# Patient Record
Sex: MALE | Race: WHITE | NOT HISPANIC OR LATINO | URBAN - NONMETROPOLITAN AREA
[De-identification: names, ages, dates, MRNs, and addresses within clinical notes are randomized per-mention and may not be internally consistent; named-entity substitution may affect disease eponyms.]

---

## 2017-03-02 RX ORDER — NEBIVOLOL HYDROCHLORIDE 10 MG/1
10 TABLET ORAL DAILY
Qty: 30 TABLET | Refills: 0 | Status: CANCELLED | OUTPATIENT
Start: 2017-03-02

## 2017-03-02 RX ORDER — NEBIVOLOL HYDROCHLORIDE 10 MG/1
10 TABLET ORAL DAILY
COMMUNITY
Start: 2016-12-28 | End: 2017-03-03 | Stop reason: SDUPTHER

## 2017-03-02 NOTE — TELEPHONE ENCOUNTER
----- Message from Elina Palmer sent at 3/2/2017  1:26 PM CST -----  Regarding: Refill Request  Received call from Sanjeev, patient is needing Bistolic 10mg refilled. Explained to pharmacy that he has not been seen in over a year and no showed to his last appointment. Patient called office and resched appt for 3/22 with Dr Servin. Patient is requesting 30 day supply if possible and would like call back to know for sure that a 30 day supply was sent in. He is going to [place] this week and will run out before his 3/22 appt.    Thanks        SENT RX TO DR SERVIN TO REVIEW

## 2017-03-03 ENCOUNTER — TELEPHONE (OUTPATIENT)
Dept: CARDIOLOGY | Facility: CLINIC | Age: 37
End: 2017-03-03

## 2017-03-03 RX ORDER — NEBIVOLOL HYDROCHLORIDE 10 MG/1
10 TABLET ORAL DAILY
Qty: 30 TABLET | Refills: 11 | Status: SHIPPED | OUTPATIENT
Start: 2017-03-03 | End: 2017-03-22 | Stop reason: SDUPTHER

## 2017-03-03 NOTE — TELEPHONE ENCOUNTER
----- Message from Olimpia Haji sent at 3/3/2017  8:49 AM CST -----  PATIENT'S WIFE STATED THE RX STILL ISN'T AT THE PHARMACY. THEY NEED THE BYSTOLIC SENT TO [facility].      THIS HAS BEEN SENT TO DR SERVIN TO REVIEW

## 2017-03-03 NOTE — TELEPHONE ENCOUNTER
----- Message from Olimpia Haji sent at 3/3/2017 12:35 PM CST -----  [facility] CALLED ABOUT THE REFILL ON BYSTOLIC FOR THIS PATIENT. THEY STATED PATIENT NEEDS REFILL        THIS HAS BEEN SENT AND APPROVED BY DR SERVIN

## 2017-03-22 ENCOUNTER — OFFICE VISIT (OUTPATIENT)
Dept: CARDIOLOGY | Facility: CLINIC | Age: 37
End: 2017-03-22

## 2017-03-22 VITALS
OXYGEN SATURATION: 98 % | HEIGHT: 72 IN | HEART RATE: 72 BPM | WEIGHT: 222 LBS | BODY MASS INDEX: 30.07 KG/M2 | DIASTOLIC BLOOD PRESSURE: 80 MMHG | SYSTOLIC BLOOD PRESSURE: 122 MMHG

## 2017-03-22 DIAGNOSIS — I10 ESSENTIAL HYPERTENSION: Primary | ICD-10-CM

## 2017-03-22 DIAGNOSIS — K21.9 GASTROESOPHAGEAL REFLUX DISEASE, ESOPHAGITIS PRESENCE NOT SPECIFIED: ICD-10-CM

## 2017-03-22 DIAGNOSIS — Z82.49 FAMILY HISTORY OF CORONARY ARTERIOSCLEROSIS: ICD-10-CM

## 2017-03-22 DIAGNOSIS — E66.09 NON MORBID OBESITY DUE TO EXCESS CALORIES: ICD-10-CM

## 2017-03-22 PROCEDURE — 99214 OFFICE O/P EST MOD 30 MIN: CPT | Performed by: INTERNAL MEDICINE

## 2017-03-22 RX ORDER — PANTOPRAZOLE SODIUM 40 MG/1
40 TABLET, DELAYED RELEASE ORAL 2 TIMES DAILY
Refills: 11 | COMMUNITY
Start: 2017-03-18

## 2017-03-22 RX ORDER — NEBIVOLOL HYDROCHLORIDE 10 MG/1
10 TABLET ORAL DAILY
Qty: 30 TABLET | Refills: 11 | Status: SHIPPED | OUTPATIENT
Start: 2017-03-22 | End: 2018-03-06 | Stop reason: SDUPTHER

## 2017-03-22 NOTE — PROGRESS NOTES
Reason for Visit: cardiovascular follow up.    HPI:  Anibal Tee is a 37 y.o. male is here today for 1 year follow-up.  He is a former patient of Dr. bill and is transitioning care.  He has been doing well on the Bystolic.  He notes that it controls his heart rate and his blood pressure.  He has not been having any cardiac symptoms other than a rare PVC.  He denies any chest pain, dizziness, syncope, PND, or orthopnea.      Previous Cardiac Testing and Procedures:  - Holter monitor (12/20/2012) frequent isolated premature ventricular contractions, 44% of all heart beats or tachycardia with average heart rate 92 bpm    Patient Active Problem List   Diagnosis   • Kidney stone   • GERD (gastroesophageal reflux disease)   • Non morbid obesity due to excess calories   • Essential hypertension       Social History   Substance Use Topics   • Smoking status: Passive Smoke Exposure - Never Smoker   • Smokeless tobacco: Never Used   • Alcohol use No       Family History   Problem Relation Age of Onset   • Hypertension Mother    • Arrhythmia Mother    • Hypertension Father    • Heart attack Father        The following portions of the patient's history were reviewed and updated as appropriate: allergies, current medications, past family history, past medical history, past social history, past surgical history and problem list.      Current Outpatient Prescriptions:   •  BYSTOLIC 10 MG tablet, Take 1 tablet by mouth Daily., Disp: 30 tablet, Rfl: 11  •  pantoprazole (PROTONIX) 40 MG EC tablet, Take 40 mg by mouth Daily., Disp: , Rfl: 11    Review of Systems   Constitution: Negative for chills, decreased appetite and fever.   HENT: Negative for congestion, headaches and nosebleeds.    Eyes: Negative for blurred vision and double vision.   Cardiovascular: Positive for near-syncope and syncope. Negative for chest pain, irregular heartbeat, leg swelling and palpitations.   Respiratory: Negative for cough and shortness of  "breath.    Endocrine: Negative for cold intolerance and heat intolerance.   Hematologic/Lymphatic: Does not bruise/bleed easily.   Skin: Negative for dry skin, itching and rash.   Musculoskeletal: Negative for back pain, joint pain, muscle cramps and neck pain.   Gastrointestinal: Positive for heartburn. Negative for abdominal pain, constipation, diarrhea and melena.   Genitourinary: Negative for dysuria, frequency and hematuria.   Neurological: Negative for dizziness, light-headedness, loss of balance and numbness.   Psychiatric/Behavioral: Negative for depression. The patient does not have insomnia.        Objective   /80 (BP Location: Left arm, Patient Position: Sitting, Cuff Size: Adult)  Pulse 72  Ht 72\" (182.9 cm)  Wt 222 lb (101 kg)  SpO2 98%  BMI 30.11 kg/m2  Physical Exam   Constitutional: He is oriented to person, place, and time. He appears well-developed and well-nourished.   HENT:   Head: Normocephalic and atraumatic.   Cardiovascular: Normal rate, regular rhythm and normal heart sounds.    No murmur heard.  Pulmonary/Chest: Effort normal and breath sounds normal.   Musculoskeletal: He exhibits no edema.   Neurological: He is alert and oriented to person, place, and time.   Skin: Skin is warm and dry.   Psychiatric: He has a normal mood and affect.     Procedures      ICD-10-CM ICD-9-CM   1. Essential hypertension I10 401.9   2. Non morbid obesity due to excess calories E66.09 278.00   3. Family history of coronary arteriosclerosis Z82.49 V17.3   4. Gastroesophageal reflux disease, esophagitis presence not specified K21.9 530.81         Assessment/Plan:  1.  Essential hypertension: Blood pressure is well controlled on Bystolic.  We will refill prescription.    2.  Obesity: BMI 30,  on diet, exercise, weight loss.    3.  Family history of heart disease: [place] on lifestyle characteristics to reduce risk of future cardiac problems.  Will obtain copy of last lipid panel from primary care " physician.  If none available will order one.    4.  Gastroesophageal reflux disease: Managed on pantoprazole.

## 2017-04-14 ENCOUNTER — TELEPHONE (OUTPATIENT)
Dept: CARDIOLOGY | Facility: CLINIC | Age: 37
End: 2017-04-14

## 2017-04-14 NOTE — TELEPHONE ENCOUNTER
----- Message from Domingo Rincon MD sent at 3/22/2017 10:05 AM CDT -----  Please obtain copy of last lipid panel from PCP and scanned into chart.    Called [facility] they havent dont any labs in long time,   Found some on [facility], and printed them out and will scan them in

## 2018-03-06 DIAGNOSIS — I10 ESSENTIAL HYPERTENSION: ICD-10-CM

## 2018-03-06 RX ORDER — NEBIVOLOL HYDROCHLORIDE 10 MG/1
TABLET ORAL
Qty: 30 TABLET | Refills: 0 | Status: SHIPPED | OUTPATIENT
Start: 2018-03-06 | End: 2018-03-08 | Stop reason: SDUPTHER

## 2018-03-08 ENCOUNTER — OFFICE VISIT (OUTPATIENT)
Dept: CARDIOLOGY | Facility: CLINIC | Age: 38
End: 2018-03-08

## 2018-03-08 ENCOUNTER — OUTSIDE FACILITY SERVICE (OUTPATIENT)
Dept: CARDIOLOGY | Facility: CLINIC | Age: 38
End: 2018-03-08

## 2018-03-08 VITALS
OXYGEN SATURATION: 98 % | BODY MASS INDEX: 30.2 KG/M2 | WEIGHT: 223 LBS | HEART RATE: 85 BPM | DIASTOLIC BLOOD PRESSURE: 70 MMHG | HEIGHT: 72 IN | SYSTOLIC BLOOD PRESSURE: 110 MMHG

## 2018-03-08 DIAGNOSIS — E66.09 NON MORBID OBESITY DUE TO EXCESS CALORIES: ICD-10-CM

## 2018-03-08 DIAGNOSIS — I49.3 FREQUENT PVCS: Primary | ICD-10-CM

## 2018-03-08 DIAGNOSIS — I10 ESSENTIAL HYPERTENSION: ICD-10-CM

## 2018-03-08 DIAGNOSIS — R00.2 PALPITATIONS: ICD-10-CM

## 2018-03-08 PROCEDURE — 99214 OFFICE O/P EST MOD 30 MIN: CPT | Performed by: INTERNAL MEDICINE

## 2018-03-08 PROCEDURE — 93000 ELECTROCARDIOGRAM COMPLETE: CPT | Performed by: INTERNAL MEDICINE

## 2018-03-08 RX ORDER — NEBIVOLOL HYDROCHLORIDE 10 MG/1
10 TABLET ORAL DAILY
Qty: 30 TABLET | Refills: 11 | Status: SHIPPED | OUTPATIENT
Start: 2018-03-08 | End: 2019-04-23 | Stop reason: SDUPTHER

## 2018-03-08 NOTE — PROGRESS NOTES
Reason for Visit: cardiovascular follow up.    HPI:  Anibal Tee is a 38 y.o. male is here today for follow-up.  He has noticed increased frequency of palpitations recently.  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  He had previously been doing well and had a decreased PVC frequency.  His blood pressure and heart rate have both been well controlled on the by systolic.    Previous Cardiac Testing and Procedures:  - Holter monitor (12/20/2012) frequent isolated premature ventricular contractions, 44% of all heart beats or tachycardia with average heart rate 92 bpm    Patient Active Problem List   Diagnosis   • Kidney stone   • GERD (gastroesophageal reflux disease)   • Non morbid obesity due to excess calories   • Essential hypertension       Social History   Substance Use Topics   • Smoking status: Passive Smoke Exposure - Never Smoker   • Smokeless tobacco: Never Used   • Alcohol use No       Family History   Problem Relation Age of Onset   • Hypertension Mother    • Arrhythmia Mother    • Hypertension Father    • Heart attack Father        The following portions of the patient's history were reviewed and updated as appropriate: allergies, current medications, past family history, past medical history, past social history, past surgical history and problem list.      Current Outpatient Prescriptions:   •  BYSTOLIC 10 MG tablet, Take 1 tablet by mouth Daily., Disp: 30 tablet, Rfl: 11  •  pantoprazole (PROTONIX) 40 MG EC tablet, Take 40 mg by mouth Daily., Disp: , Rfl: 11    Review of Systems   Constitution: Negative for chills, decreased appetite and fever.   HENT: Negative for congestion and nosebleeds.    Eyes: Negative for blurred vision and double vision.   Cardiovascular: Negative for chest pain, irregular heartbeat, leg swelling, near-syncope, palpitations and syncope.   Respiratory: Negative for cough and  "shortness of breath.    Endocrine: Negative for cold intolerance and heat intolerance.   Hematologic/Lymphatic: Does not bruise/bleed easily.   Skin: Negative for dry skin, itching and rash.   Musculoskeletal: Negative for back pain, joint pain, muscle cramps and neck pain.   Gastrointestinal: Positive for heartburn. Negative for abdominal pain, constipation, diarrhea and melena.   Genitourinary: Negative for dysuria, frequency and hematuria.   Neurological: Negative for dizziness, headaches, light-headedness, loss of balance and numbness.   Psychiatric/Behavioral: Negative for depression. The patient does not have insomnia.        Objective   /70 (BP Location: Left arm, Patient Position: Sitting, Cuff Size: Adult)  Pulse 85  Ht 182.9 cm (72\")  Wt 101 kg (223 lb)  SpO2 98%  BMI 30.24 kg/m2  Physical Exam   Constitutional: He is oriented to person, place, and time. He appears well-developed and well-nourished.   HENT:   Head: Normocephalic and atraumatic.   Cardiovascular: Normal rate, regular rhythm and normal heart sounds.    No murmur heard.  Pulmonary/Chest: Effort normal and breath sounds normal.   Musculoskeletal: He exhibits no edema.   Neurological: He is alert and oriented to person, place, and time.   Skin: Skin is warm and dry.   Psychiatric: He has a normal mood and affect.       ECG 12 Lead  Date/Time: 3/8/2018 9:17 AM  Performed by: AKIL SERVIN  Authorized by: AKIL SERVIN   Previous ECG: no previous ECG available  Rhythm: sinus rhythm  Rate: normal  Other findings: early repolarization              ICD-10-CM ICD-9-CM   1. 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 Palpitations R00.2 785.1   3. Essential hypertension I10 401.9   4. Non morbid obesity due to excess calories E66.09 278.00         Assessment/Plan:  1.  Frequent PVCs: Noted on previous Holter monitor from 2012. Now having more frequent palpitations.  Will check 24-hour Holter monitor and refill Bystolic.    2.  Palpitations: Appear " to be secondary to PVCs.  Holter monitor ordered for further evaluation.    3.  Essential hypertension: Well-controlled on current therapy.    4.  Obesity: BMI 30.  Patient's BMI is above normal parameters. Follow-up plan includes:  exercise counseling and nutrition counseling.

## 2018-03-13 PROCEDURE — 93227 XTRNL ECG REC<48 HR R&I: CPT | Performed by: INTERNAL MEDICINE

## 2018-04-18 DIAGNOSIS — I10 ESSENTIAL HYPERTENSION: ICD-10-CM

## 2018-04-19 DIAGNOSIS — I10 ESSENTIAL HYPERTENSION: ICD-10-CM

## 2018-04-19 RX ORDER — NEBIVOLOL HYDROCHLORIDE 10 MG/1
TABLET ORAL
Qty: 30 TABLET | Refills: 0 | OUTPATIENT
Start: 2018-04-19

## 2018-04-23 RX ORDER — NEBIVOLOL HYDROCHLORIDE 10 MG/1
10 TABLET ORAL DAILY
Qty: 30 TABLET | Refills: 11 | Status: SHIPPED | OUTPATIENT
Start: 2018-04-23 | End: 2019-04-23 | Stop reason: SDUPTHER

## 2018-05-15 ENCOUNTER — TELEPHONE (OUTPATIENT)
Dept: CARDIOLOGY | Facility: CLINIC | Age: 38
End: 2018-05-15

## 2018-05-15 NOTE — TELEPHONE ENCOUNTER
Carter from [facility] in [place] called and said that she faxed over a request for pt's ekg and a cardiac clearance letter for pt to have surgery.  Do you know anything about this or did you get anything off the fax line?

## 2018-05-16 ENCOUNTER — TELEPHONE (OUTPATIENT)
Dept: CARDIOLOGY | Facility: CLINIC | Age: 38
End: 2018-05-16

## 2018-05-16 NOTE — TELEPHONE ENCOUNTER
There are no cardiac contraindications to pharmacotherapy to weight loss; however, particular medications and have side effects of tachycardia should be avoided.

## 2018-05-16 NOTE — TELEPHONE ENCOUNTER
[facility] in [place] is needing a clearance letter from you so that pt can start a weight loss medication.

## 2019-03-21 DIAGNOSIS — I10 ESSENTIAL HYPERTENSION: ICD-10-CM

## 2019-03-21 RX ORDER — NEBIVOLOL HYDROCHLORIDE 10 MG/1
TABLET ORAL
Qty: 30 TABLET | Refills: 0 | OUTPATIENT
Start: 2019-03-21

## 2019-04-23 DIAGNOSIS — I10 ESSENTIAL HYPERTENSION: ICD-10-CM

## 2019-04-23 RX ORDER — NEBIVOLOL HYDROCHLORIDE 10 MG/1
10 TABLET ORAL DAILY
Qty: 30 TABLET | Refills: 11 | Status: SHIPPED | OUTPATIENT
Start: 2019-04-23 | End: 2019-05-20 | Stop reason: SDUPTHER

## 2019-04-24 RX ORDER — NEBIVOLOL HYDROCHLORIDE 10 MG/1
TABLET ORAL
Qty: 30 TABLET | Refills: 0 | Status: SHIPPED | OUTPATIENT
Start: 2019-04-24 | End: 2019-05-20 | Stop reason: SDUPTHER

## 2019-05-20 ENCOUNTER — OFFICE VISIT (OUTPATIENT)
Dept: CARDIOLOGY | Facility: CLINIC | Age: 39
End: 2019-05-20

## 2019-05-20 VITALS
WEIGHT: 219 LBS | HEART RATE: 70 BPM | SYSTOLIC BLOOD PRESSURE: 118 MMHG | BODY MASS INDEX: 29.66 KG/M2 | HEIGHT: 72 IN | OXYGEN SATURATION: 97 % | DIASTOLIC BLOOD PRESSURE: 70 MMHG

## 2019-05-20 DIAGNOSIS — I49.3 PVC (PREMATURE VENTRICULAR CONTRACTION): Primary | ICD-10-CM

## 2019-05-20 DIAGNOSIS — I10 ESSENTIAL HYPERTENSION: ICD-10-CM

## 2019-05-20 PROCEDURE — 99214 OFFICE O/P EST MOD 30 MIN: CPT | Performed by: NURSE PRACTITIONER

## 2019-05-20 RX ORDER — NEBIVOLOL HYDROCHLORIDE 10 MG/1
10 TABLET ORAL DAILY
Qty: 30 TABLET | Refills: 11 | Status: SHIPPED | OUTPATIENT
Start: 2019-05-20 | End: 2020-06-09

## 2019-05-20 NOTE — PROGRESS NOTES
.      Subjective:     Encounter Date:05/20/2019    Chief Complaint:    Patient ID: Anibal Tee is a 39 y.o. male here today for yearly cardiac follow-up. 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    HPI     Palpitations      Additional comments: Hx occasional PVCs, controlled with Bystolic, no near syncope or syncope              Hypertension      Additional comments: Well controlled with Bystolic, needs Bystolic refilled, doesn't have copay card, has been paying $44 per month. Other medications didn't work as well.           Last edited by Maria Esther Flores APRN on 5/20/2019  5:03 PM. (History)        History:   Past Medical History:   Diagnosis Date   • Mcginnis esophagus    • Hypertension    • Kidney stone    • Palpitations    • PVC (premature ventricular contraction)    • Screening for hyperlipidemia    • Sinusitis    • Tachycardia      Past Surgical History:   Procedure Laterality Date   • ENDOSCOPY     • ESOPHAGEAL DILATATION       Social History     Socioeconomic History   • Marital status:      Spouse name: Not on file   • Number of children: Not on file   • Years of education: Not on file   • Highest education level: Not on file   Tobacco Use   • Smoking status: Passive Smoke Exposure - Never Smoker   • Smokeless tobacco: Never Used   Substance and Sexual Activity   • Alcohol use: No   • Drug use: No   • Sexual activity: Defer     Family History   Problem Relation Age of Onset   • Hypertension Mother    • Arrhythmia Mother    • Hypertension Father    • Heart attack Father        Outpatient Medications Marked as Taking for the 5/20/19 encounter (Office Visit) with Maria Esther Flores APRN   Medication Sig Dispense Refill   • BYSTOLIC 10 MG tablet Take 1 tablet by mouth Daily. 30 tablet 11   • pantoprazole (PROTONIX) 40 MG EC tablet Take 40 mg by mouth 2 (Two) Times a Day.  11   • [DISCONTINUED] BYSTOLIC 10 MG tablet Take 1 tablet by mouth Daily. 30 tablet 11       Review of Systems:  Review of  "Systems   Constitution: Negative for chills, decreased appetite and fever.   HENT: Negative for congestion and nosebleeds.    Eyes: Negative for blurred vision and double vision.   Cardiovascular: Positive for palpitations (rarely). Negative for chest pain, dyspnea on exertion, irregular heartbeat, leg swelling, near-syncope and syncope.   Respiratory: Negative for cough and shortness of breath.    Endocrine: Negative for cold intolerance and heat intolerance.   Hematologic/Lymphatic: Does not bruise/bleed easily.   Skin: Negative for dry skin, itching and rash.   Musculoskeletal: Negative for back pain, joint pain, joint swelling, muscle cramps, muscle weakness and neck pain.   Gastrointestinal: Positive for heartburn (rarely). Negative for abdominal pain, constipation, diarrhea and melena.   Genitourinary: Negative for dysuria, frequency and hematuria.   Neurological: Negative for dizziness, headaches, light-headedness, loss of balance and numbness.   Psychiatric/Behavioral: Negative for depression. The patient does not have insomnia and is not nervous/anxious.             Objective:   /70 (BP Location: Left arm, Patient Position: Sitting, Cuff Size: Adult)   Pulse 70   Ht 182.9 cm (72\")   Wt 99.3 kg (219 lb)   SpO2 97%   BMI 29.70 kg/m²   Wt Readings from Last 3 Encounters:   05/20/19 99.3 kg (219 lb)   03/08/18 101 kg (223 lb)   03/22/17 101 kg (222 lb)         Physical Exam   Constitutional: He is oriented to person, place, and time. He appears well-developed and well-nourished.   HENT:   Head: Normocephalic and atraumatic.   Eyes: No scleral icterus.   Neck: No JVD present.   Cardiovascular: Normal rate, regular rhythm, normal heart sounds and intact distal pulses. Exam reveals no gallop and no friction rub.   No murmur heard.  Pulmonary/Chest: Effort normal and breath sounds normal.   Musculoskeletal: He exhibits no edema.   Neurological: He is alert and oriented to person, place, and time.   Skin: " Skin is warm and dry.   Psychiatric: He has a normal mood and affect.   Vitals reviewed.      Lab/Diagnostics Review:     3/8/2018 24-hour Holter monitor rare atrial ectopic beats, occasional ventricular ectopic beats, no significant pauses arrhythmias or events.  Patient events associated primarily with sinus rhythm with PVCs.    1/17/2017  CBC WBC 10,100 hemoglobin 16.1 hematocrit 46.9% platelets 290,000  BMP sodium 140 potassium 4.5 chloride 102 CO2 32.5 BUN 18 creatinine 1.19 calcium 9.2 glucose 99    12/20/2012 24-hour Holter monitor normal sinus rhythm with isolated PVCs 44% of all beats during tachycardia less than 1% and bradycardia.    12/11/2012 lipid panel total cholesterol 143 triglycerides 80 HDL 37 LDL 90    Procedures: none in office today        Assessment/Plan:         Problem List Items Addressed This Visit        Cardiovascular and Mediastinum    Essential hypertension    Current Assessment & Plan     Well controlled with Bystolic. Continue present therapy.          Relevant Medications    BYSTOLIC 10 MG tablet    PVC (premature ventricular contraction) - Primary    Current Assessment & Plan     Well controlled with Bystolic. Continue present therapy. Pt will download copay card and call if can get a 90 day supply to save on costs.         Relevant Medications    BYSTOLIC 10 MG tablet        Return in about 2 years (around 5/20/2021) for Recheck.           OSMAR Kate, ACNP-BC, CHFN-BC

## 2019-05-20 NOTE — ASSESSMENT & PLAN NOTE
Well controlled with Bystolic. Continue present therapy. Pt will download copay card and call if can get a 90 day supply to save on costs.

## 2020-06-09 DIAGNOSIS — I10 ESSENTIAL HYPERTENSION: ICD-10-CM

## 2020-06-09 RX ORDER — NEBIVOLOL 10 MG/1
10 TABLET ORAL DAILY
Qty: 90 TABLET | Refills: 3 | Status: SHIPPED | OUTPATIENT
Start: 2020-06-09 | End: 2021-06-09

## 2020-10-07 ENCOUNTER — TELEPHONE (OUTPATIENT)
Dept: CARDIOLOGY | Facility: CLINIC | Age: 40
End: 2020-10-07

## 2024-02-12 ENCOUNTER — TELEPHONE (OUTPATIENT)
Dept: NEUROSURGERY | Age: 44
End: 2024-02-12

## 2024-02-12 NOTE — TELEPHONE ENCOUNTER
1st attempt to contact patient. I left a voicemail instructing patient to call back at [phone] to schedule their new patient appointment     Cervicalgia     XR REPORT IN [facility] 1/25/24